# Patient Record
Sex: FEMALE | URBAN - METROPOLITAN AREA
[De-identification: names, ages, dates, MRNs, and addresses within clinical notes are randomized per-mention and may not be internally consistent; named-entity substitution may affect disease eponyms.]

---

## 2022-06-04 ENCOUNTER — TELEPHONE ENCOUNTER (OUTPATIENT)
Dept: URBAN - METROPOLITAN AREA CLINIC 68 | Facility: CLINIC | Age: 72
End: 2022-06-04

## 2022-06-04 RX ORDER — OMEGA-3/DHA/EPA/FISH OIL 60 MG-90MG
FISH OIL(    ONE CAPSULE BY MOUTH TWICE EACH DAY ) INACTIVE -HX ENTRY CAPSULE ORAL
OUTPATIENT
Start: 2008-07-15

## 2022-06-04 RX ORDER — RABEPRAZOLE SODIUM 20 MG/1
ACIPHEX(    ONE PO 30 MINUTES BEFORE BREAKFAST. ) INACTIVE -HX ENTRY TABLET, DELAYED RELEASE ORAL
OUTPATIENT
Start: 2008-07-24

## 2022-06-04 RX ORDER — SULFAMETHOXAZOLE/TRIMETHOPRIM 800-160 MG
BACTRIM DS(    1 TABLET PO BID ) INACTIVE -HX ENTRY TABLET ORAL
OUTPATIENT
Start: 2010-12-09

## 2022-06-04 RX ORDER — PANTOPRAZOLE SODIUM 40 MG/1
PROTONIX(    1 TABLET DAILY ) INACTIVE -HX ENTRY TABLET, DELAYED RELEASE ORAL
OUTPATIENT
Start: 2008-07-15

## 2022-06-04 RX ORDER — RABEPRAZOLE SODIUM 20 MG/1
ACIPHEX(    ONE PO 30 MINUTES BEFORE BREAKFAST. ) INACTIVE -HX ENTRY TABLET, DELAYED RELEASE ORAL
OUTPATIENT
Start: 2008-12-23

## 2022-06-04 RX ORDER — RABEPRAZOLE SODIUM 20 MG/1
ACIPHEX(    ONE PO 30 MINUTES BEFORE BREAKFAST. ) INACTIVE -HX ENTRY TABLET, DELAYED RELEASE ORAL
OUTPATIENT
Start: 2008-08-15

## 2022-06-05 ENCOUNTER — TELEPHONE ENCOUNTER (OUTPATIENT)
Dept: URBAN - METROPOLITAN AREA CLINIC 68 | Facility: CLINIC | Age: 72
End: 2022-06-05

## 2022-06-05 RX ORDER — MONTELUKAST SODIUM 10 MG/1
TABLET, FILM COATED ORAL
Status: ACTIVE | COMMUNITY
Start: 2008-07-15

## 2022-06-05 RX ORDER — OMEPRAZOLE 20 MG/1
CAPSULE, DELAYED RELEASE ORAL
Status: ACTIVE | COMMUNITY
Start: 2009-05-19

## 2022-06-25 ENCOUNTER — TELEPHONE ENCOUNTER (OUTPATIENT)
Age: 72
End: 2022-06-25

## 2022-06-25 RX ORDER — RABEPRAZOLE SODIUM 20 MG/1
ACIPHEX(    ONE PO 30 MINUTES BEFORE BREAKFAST. ) INACTIVE -HX ENTRY TABLET, DELAYED RELEASE ORAL
OUTPATIENT
Start: 2008-07-24

## 2022-06-25 RX ORDER — POLYETHYLENE GLYCOL 3350, SODIUM SULFATE, SODIUM CHLORIDE, POTASSIUM CHLORIDE, ASCORBIC ACID, SODIUM ASCORBATE 7.5-2.691G
MOVIPREP(    AS DIRECTED ) INACTIVE -HX ENTRY KIT ORAL AS DIRECTED
OUTPATIENT
Start: 2010-10-14

## 2022-06-25 RX ORDER — SULFAMETHOXAZOLE/TRIMETHOPRIM 800-160 MG
BACTRIM DS(    1 TABLET PO BID ) INACTIVE -HX ENTRY TABLET ORAL
OUTPATIENT
Start: 2010-12-09

## 2022-06-25 RX ORDER — OMEGA-3/DHA/EPA/FISH OIL 60 MG-90MG
FISH OIL(    ONE CAPSULE BY MOUTH TWICE EACH DAY ) INACTIVE -HX ENTRY CAPSULE ORAL
OUTPATIENT
Start: 2008-07-15

## 2022-06-25 RX ORDER — RABEPRAZOLE SODIUM 20 MG/1
ACIPHEX(    ONE PO 30 MINUTES BEFORE BREAKFAST. ) INACTIVE -HX ENTRY TABLET, DELAYED RELEASE ORAL
OUTPATIENT
Start: 2008-12-23

## 2022-06-25 RX ORDER — CHOLESTYRAMINE 4 G/9G
QUESTRAN(    1 PACK DAILY ) INACTIVE -HX ENTRY POWDER, FOR SUSPENSION ORAL
OUTPATIENT
Start: 2010-07-27

## 2022-06-25 RX ORDER — RABEPRAZOLE SODIUM 20 MG/1
ACIPHEX(    ONE PO 30 MINUTES BEFORE BREAKFAST. ) INACTIVE -HX ENTRY TABLET, DELAYED RELEASE ORAL
OUTPATIENT
Start: 2008-08-15

## 2022-06-26 ENCOUNTER — TELEPHONE ENCOUNTER (OUTPATIENT)
Age: 72
End: 2022-06-26

## 2022-06-26 RX ORDER — OMEPRAZOLE 20 MG/1
CAPSULE, DELAYED RELEASE ORAL
Status: ACTIVE | COMMUNITY
Start: 2009-05-19

## 2022-06-26 RX ORDER — ATENOLOL 25 MG/1
ATENOLOL(    1 TABLET DAILY ) ACTIVE -HX ENTRY TABLET ORAL
Status: ACTIVE | COMMUNITY
Start: 2008-07-15

## 2022-06-26 RX ORDER — MONTELUKAST SODIUM 10 MG/1
TABLET, FILM COATED ORAL
Status: ACTIVE | COMMUNITY
Start: 2008-07-15

## 2022-06-26 RX ORDER — EZETIMIBE AND SIMVASTATIN 10; 10 MG/1; MG/1
VYTORIN(    1 PO QD ) ACTIVE -HX ENTRY TABLET ORAL
Status: ACTIVE | COMMUNITY
Start: 2008-07-15

## 2022-06-26 RX ORDER — DEXTROMETHORPHAN POLISTIREX 30 MG/5 ML
CALCIUM-VITAMIN D(    1 TABLET DAILY ) ACTIVE -HX ENTRY SUSPENSION, EXTENDED RELEASE 12 HR ORAL
Status: ACTIVE | COMMUNITY
Start: 2008-07-15

## 2022-06-26 RX ORDER — CITALOPRAM 10 MG/1
CITALOPRAM HYDROBROMIDE(    1 TABLET DAILY ) ACTIVE -HX ENTRY TABLET ORAL
Status: ACTIVE | COMMUNITY
Start: 2010-07-27

## 2024-01-03 ENCOUNTER — OFFICE VISIT (OUTPATIENT)
Dept: URBAN - METROPOLITAN AREA CLINIC 68 | Facility: CLINIC | Age: 74
End: 2024-01-03

## 2024-02-23 ENCOUNTER — LAB (OUTPATIENT)
Dept: URBAN - METROPOLITAN AREA CLINIC 68 | Facility: CLINIC | Age: 74
End: 2024-02-23

## 2024-02-26 ENCOUNTER — OV NP (OUTPATIENT)
Dept: URBAN - METROPOLITAN AREA CLINIC 68 | Facility: CLINIC | Age: 74
End: 2024-02-26
Payer: MEDICARE

## 2024-02-26 ENCOUNTER — LAB (OUTPATIENT)
Dept: URBAN - METROPOLITAN AREA CLINIC 68 | Facility: CLINIC | Age: 74
End: 2024-02-26

## 2024-02-26 VITALS
HEIGHT: 67 IN | DIASTOLIC BLOOD PRESSURE: 68 MMHG | BODY MASS INDEX: 22.6 KG/M2 | WEIGHT: 144 LBS | SYSTOLIC BLOOD PRESSURE: 118 MMHG

## 2024-02-26 DIAGNOSIS — Z86.010 PERSONAL HISTORY OF COLONIC POLYPS: ICD-10-CM

## 2024-02-26 DIAGNOSIS — K76.0 FATTY LIVER: ICD-10-CM

## 2024-02-26 DIAGNOSIS — R15.9 FREQUENT FECAL INCONTINENCE: ICD-10-CM

## 2024-02-26 DIAGNOSIS — R19.7 INTERMITTENT DIARRHEA: ICD-10-CM

## 2024-02-26 PROBLEM — 197321007: Status: ACTIVE | Noted: 2024-02-23

## 2024-02-26 PROBLEM — 422587007: Status: ACTIVE | Noted: 2024-02-26

## 2024-02-26 PROBLEM — 428283002: Status: ACTIVE | Noted: 2024-02-23

## 2024-02-26 PROBLEM — 89362005: Status: ACTIVE | Noted: 2024-02-26

## 2024-02-26 PROBLEM — 460671000124103: Status: ACTIVE | Noted: 2024-02-26

## 2024-02-26 PROBLEM — 62315008: Status: ACTIVE | Noted: 2024-02-26

## 2024-02-26 PROCEDURE — 99204 OFFICE O/P NEW MOD 45 MIN: CPT

## 2024-02-26 RX ORDER — OMEPRAZOLE 20 MG/1
CAPSULE, DELAYED RELEASE ORAL
Status: ACTIVE | COMMUNITY
Start: 2009-05-19

## 2024-02-26 RX ORDER — MONTELUKAST SODIUM 10 MG/1
TABLET, FILM COATED ORAL
Status: ACTIVE | COMMUNITY
Start: 2008-07-15

## 2024-02-26 RX ORDER — CITALOPRAM 10 MG/1
CITALOPRAM HYDROBROMIDE(    1 TABLET DAILY ) ACTIVE -HX ENTRY TABLET ORAL
Status: ACTIVE | COMMUNITY
Start: 2010-07-27

## 2024-02-26 RX ORDER — ATENOLOL 25 MG/1
ATENOLOL(    1 TABLET DAILY ) ACTIVE -HX ENTRY TABLET ORAL
Status: ACTIVE | COMMUNITY
Start: 2008-07-15

## 2024-02-26 RX ORDER — EZETIMIBE AND SIMVASTATIN 10; 10 MG/1; MG/1
VYTORIN(    1 PO QD ) ACTIVE -HX ENTRY TABLET ORAL
Status: ACTIVE | COMMUNITY
Start: 2008-07-15

## 2024-02-26 RX ORDER — DEXTROMETHORPHAN POLISTIREX 30 MG/5 ML
CALCIUM-VITAMIN D(    1 TABLET DAILY ) ACTIVE -HX ENTRY SUSPENSION, EXTENDED RELEASE 12 HR ORAL
Status: ACTIVE | COMMUNITY
Start: 2008-07-15

## 2024-02-26 NOTE — HPI-TODAY'S VISIT:
72 y/o with history of colon polyps, IBS, celiac disease, and prediabetes presents today due to chronic fecal incontinence which has been worsening over the past few months per patient.  She is s/p colonoscopy in 2021 with Dr. Shook found with diverticulosis and IH.  She has had a couple episodes of nocturnal fecal incontinence.  She denies black stools or rectal bleeding.  She describes being diagnosed with celiac disease 20+ years ago and does adhere to a gluten-free diet as well as a strict low FODMAP diet.  She is frustrated because she feels there is not much she can eat as she is also following a "diabetic diet".  She describes a 30 pound weight loss over the past 4-5 months and is recommended CT of the abdomen/pelvis.  Will proceed with stool studies and she will likely need a colonoscopy in the near future, this was discussed today.  She does describe intermittent mild nausea as well as lower abdominal cramping.  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 She denies vomiting, dysphagia, abdominal pain, constipation, melena, rectal bleeding, weight loss, fever.

## 2024-02-28 PROBLEM — 266435005: Status: ACTIVE | Noted: 2024-02-28

## 2024-03-14 ENCOUNTER — LAB (OUTPATIENT)
Dept: URBAN - METROPOLITAN AREA CLINIC 68 | Facility: CLINIC | Age: 74
End: 2024-03-14

## 2024-03-14 ENCOUNTER — OV EP (OUTPATIENT)
Dept: URBAN - METROPOLITAN AREA CLINIC 68 | Facility: CLINIC | Age: 74
End: 2024-03-14
Payer: MEDICARE

## 2024-03-14 VITALS
OXYGEN SATURATION: 97 % | HEART RATE: 110 BPM | WEIGHT: 144 LBS | HEIGHT: 67 IN | DIASTOLIC BLOOD PRESSURE: 76 MMHG | SYSTOLIC BLOOD PRESSURE: 118 MMHG | BODY MASS INDEX: 22.6 KG/M2

## 2024-03-14 DIAGNOSIS — R19.7 INTERMITTENT DIARRHEA: ICD-10-CM

## 2024-03-14 DIAGNOSIS — Z86.010 PERSONAL HISTORY OF COLONIC POLYPS: ICD-10-CM

## 2024-03-14 DIAGNOSIS — R11.0 NAUSEA: ICD-10-CM

## 2024-03-14 DIAGNOSIS — R15.9 FREQUENT FECAL INCONTINENCE: ICD-10-CM

## 2024-03-14 DIAGNOSIS — R63.4 WEIGHT LOSS: ICD-10-CM

## 2024-03-14 DIAGNOSIS — K76.0 FATTY LIVER: ICD-10-CM

## 2024-03-14 PROCEDURE — 99214 OFFICE O/P EST MOD 30 MIN: CPT

## 2024-03-14 RX ORDER — CITALOPRAM 10 MG/1
CITALOPRAM HYDROBROMIDE(    1 TABLET DAILY ) ACTIVE -HX ENTRY TABLET ORAL
Status: ACTIVE | COMMUNITY
Start: 2010-07-27

## 2024-03-14 RX ORDER — METHOTREXATE 25 MG/ML
SOLUTION INTRA-ARTERIAL; INTRAMUSCULAR; INTRATHECAL; INTRAVENOUS
Qty: 26 | Status: ACTIVE | COMMUNITY

## 2024-03-14 RX ORDER — NEEDLES, SAFETY 22GX1 1/2"
NEEDLE, DISPOSABLE MISCELLANEOUS
Qty: 13 | Status: ACTIVE | COMMUNITY

## 2024-03-14 RX ORDER — ATENOLOL 25 MG/1
ATENOLOL(    1 TABLET DAILY ) ACTIVE -HX ENTRY TABLET ORAL
Status: ACTIVE | COMMUNITY
Start: 2008-07-15

## 2024-03-14 RX ORDER — SYRINGE AND NEEDLE,INSULIN,1ML 30 G X1/2"
SYRINGE, EMPTY DISPOSABLE MISCELLANEOUS
Qty: 10 | Status: ACTIVE | COMMUNITY

## 2024-03-14 RX ORDER — OMEPRAZOLE 20 MG/1
CAPSULE, DELAYED RELEASE ORAL
Status: DISCONTINUED | COMMUNITY
Start: 2009-05-19

## 2024-03-14 RX ORDER — MONTELUKAST SODIUM 10 MG/1
TABLET, FILM COATED ORAL
Status: DISCONTINUED | COMMUNITY
Start: 2008-07-15

## 2024-03-14 NOTE — HPI-TODAY'S VISIT:
72 y/o with history of colon polyps, IBS, celiac disease, and prediabetes presents today due to chronic fecal incontinence which has been worsening over the past few months per patient. She had stool studies found normal on 3/5/24.  She did have [employer] IBS testing and results were reviewed today. She did add daily Benefiber which has helped significantly.  She is recommended HBT to r/o SIBO.  Per patient she did have colonoscopy in 2023, pending records. She is s/p colonoscopy in 2021 with Dr. Shook found with diverticulosis and IH.  She has had a couple episodes of nocturnal fecal incontinence.  She denies black stools or rectal bleeding.  She describes being diagnosed with celiac disease 20+ years ago and does adhere to a gluten-free diet as well as a strict low FODMAP diet.  She is frustrated because she feels there is not much she can eat as she is also following a "diabetic diet".  She describes a 30 pound weight loss over the past 4-5 months and is pending CT of the abdomen/pelvis.  She does describe intermittent mild nausea as well as lower abdominal cramping.  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 She denies vomiting, dysphagia, abdominal pain, constipation, melena, rectal bleeding, weight loss, fever.

## 2024-04-10 ENCOUNTER — ANC VISIT (OUTPATIENT)
Dept: URBAN - METROPOLITAN AREA CLINIC 67 | Facility: CLINIC | Age: 74
End: 2024-04-10
Payer: MEDICARE

## 2024-04-10 DIAGNOSIS — K63.8219 SMALL INTESTINAL BACTERIAL OVERGROWTH (SIBO): ICD-10-CM

## 2024-04-10 PROCEDURE — 91065 BREATH HYDROGEN/METHANE TEST: CPT | Performed by: INTERNAL MEDICINE

## 2024-04-15 ENCOUNTER — TELEP (OUTPATIENT)
Dept: URBAN - METROPOLITAN AREA CLINIC 68 | Facility: CLINIC | Age: 74
End: 2024-04-15
Payer: MEDICARE

## 2024-04-15 VITALS — HEIGHT: 67 IN | WEIGHT: 140 LBS | BODY MASS INDEX: 21.97 KG/M2

## 2024-04-15 DIAGNOSIS — K76.0 FATTY LIVER: ICD-10-CM

## 2024-04-15 DIAGNOSIS — K58.0 IRRITABLE BOWEL SYNDROME WITH DIARRHEA: ICD-10-CM

## 2024-04-15 DIAGNOSIS — R15.9 FREQUENT FECAL INCONTINENCE: ICD-10-CM

## 2024-04-15 DIAGNOSIS — R63.4 WEIGHT LOSS: ICD-10-CM

## 2024-04-15 PROCEDURE — 99214 OFFICE O/P EST MOD 30 MIN: CPT

## 2024-04-15 RX ORDER — CITALOPRAM 10 MG/1
CITALOPRAM HYDROBROMIDE(    1 TABLET DAILY ) ACTIVE -HX ENTRY TABLET ORAL
Status: ACTIVE | COMMUNITY
Start: 2010-07-27

## 2024-04-15 RX ORDER — RIFAXIMIN 550 MG/1
1 TABLET TABLET ORAL THREE TIMES A DAY
Qty: 42 TABLET | Refills: 1 | OUTPATIENT
Start: 2024-04-12 | End: 2024-05-10

## 2024-04-15 RX ORDER — SYRINGE AND NEEDLE,INSULIN,1ML 30 G X1/2"
SYRINGE, EMPTY DISPOSABLE MISCELLANEOUS
Qty: 10 | Status: ACTIVE | COMMUNITY

## 2024-04-15 RX ORDER — METHOTREXATE 25 MG/ML
SOLUTION INTRA-ARTERIAL; INTRAMUSCULAR; INTRATHECAL; INTRAVENOUS
Qty: 26 | Status: ACTIVE | COMMUNITY

## 2024-04-15 RX ORDER — ATENOLOL 25 MG/1
ATENOLOL(    1 TABLET DAILY ) ACTIVE -HX ENTRY TABLET ORAL
Status: ACTIVE | COMMUNITY
Start: 2008-07-15

## 2024-04-15 RX ORDER — NEEDLES, SAFETY 22GX1 1/2"
NEEDLE, DISPOSABLE MISCELLANEOUS
Qty: 13 | Status: ACTIVE | COMMUNITY

## 2024-04-15 NOTE — HPI-TODAY'S VISIT:
72 y/o with history of colon polyps, IBS, celiac disease, and prediabetes presents today via telehealth to review HBT results consistent with SIBO. She continues with diarrhea and intermittent episodes of fecal incontinence and is recommended tx with Xifaxan. She had stool studies found normal on 3/5/24.  She did have [employer] IBS testing.  Per patient she did have colonoscopy in 2023, pending records. She is s/p colonoscopy in 2021 with Dr. Shook found with diverticulosis and IH.  She has had a couple episodes of nocturnal fecal incontinence.  She denies black stools or rectal bleeding.  She describes being diagnosed with celiac disease 20+ years ago and does adhere to a gluten-free diet as well as a strict low FODMAP diet.  She is frustrated because she feels there is not much she can eat as she is also following a "diabetic diet".  She describes a 30 pound weight loss over the past 4-5 months and is pending CT of the abdomen/pelvis.  She does describe intermittent mild nausea as well as lower abdominal cramping.  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 She denies vomiting, dysphagia, abdominal pain, constipation, melena, rectal bleeding, weight loss, fever.

## 2024-04-22 ENCOUNTER — TELEP (OUTPATIENT)
Dept: URBAN - METROPOLITAN AREA CLINIC 68 | Facility: CLINIC | Age: 74
End: 2024-04-22
Payer: MEDICARE

## 2024-04-22 VITALS — BODY MASS INDEX: 21.97 KG/M2 | WEIGHT: 140 LBS | HEIGHT: 67 IN

## 2024-04-22 DIAGNOSIS — K76.0 FATTY LIVER: ICD-10-CM

## 2024-04-22 DIAGNOSIS — K63.8219 SMALL INTESTINAL BACTERIAL OVERGROWTH (SIBO): ICD-10-CM

## 2024-04-22 DIAGNOSIS — K58.0 IRRITABLE BOWEL SYNDROME WITH DIARRHEA: ICD-10-CM

## 2024-04-22 DIAGNOSIS — R15.9 FREQUENT FECAL INCONTINENCE: ICD-10-CM

## 2024-04-22 PROBLEM — 197125005: Status: ACTIVE | Noted: 2024-04-12

## 2024-04-22 PROBLEM — 446081009: Status: ACTIVE | Noted: 2024-04-12

## 2024-04-22 PROCEDURE — 99214 OFFICE O/P EST MOD 30 MIN: CPT

## 2024-04-22 RX ORDER — METHOTREXATE 25 MG/ML
SOLUTION INTRA-ARTERIAL; INTRAMUSCULAR; INTRATHECAL; INTRAVENOUS
Qty: 26 | Status: ACTIVE | COMMUNITY

## 2024-04-22 RX ORDER — CITALOPRAM 10 MG/1
CITALOPRAM HYDROBROMIDE(    1 TABLET DAILY ) ACTIVE -HX ENTRY TABLET ORAL
Status: ACTIVE | COMMUNITY
Start: 2010-07-27

## 2024-04-22 RX ORDER — RIFAXIMIN 550 MG/1
1 TABLET TABLET ORAL THREE TIMES A DAY
Qty: 42 TABLET | Refills: 1 | Status: ACTIVE | COMMUNITY
Start: 2024-04-12 | End: 2024-05-10

## 2024-04-22 RX ORDER — ATENOLOL 25 MG/1
ATENOLOL(    1 TABLET DAILY ) ACTIVE -HX ENTRY TABLET ORAL
Status: ACTIVE | COMMUNITY
Start: 2008-07-15

## 2024-04-22 RX ORDER — SYRINGE AND NEEDLE,INSULIN,1ML 30 G X1/2"
SYRINGE, EMPTY DISPOSABLE MISCELLANEOUS
Qty: 10 | Status: ACTIVE | COMMUNITY

## 2024-04-22 RX ORDER — NEEDLES, SAFETY 22GX1 1/2"
NEEDLE, DISPOSABLE MISCELLANEOUS
Qty: 13 | Status: ACTIVE | COMMUNITY

## 2024-04-22 NOTE — HPI-TODAY'S VISIT:
72 y/o with history of colon polyps, IBS, celiac disease, and prediabetes presents today via telehealth with questions. She had HBT consistent with SIBO and the Xifaxan is too costly for her.  She is dissatisfied that we do not have samples for her yet. Alternative abx tx was offered but patient defers. She continues with bloating, diarrhea and intermittent episodes of fecal incontinence despite daily Metamucil. She had stool studies found normal on 3/5/24.  She did have [employer] IBS testing.  Per patient she did have colonoscopy in 2023, pending records. She is s/p colonoscopy in 2021 with Dr. Shook found with diverticulosis and IH.  She has had a couple episodes of nocturnal fecal incontinence.  She denies black stools or rectal bleeding.  She describes being diagnosed with celiac disease 20+ years ago and does adhere to a gluten-free diet as well as a strict low FODMAP diet.  She is frustrated because she feels there is not much she can eat as she is also following a "diabetic diet".  She describes a 30 pound weight loss over the past 4-5 months and is pending CT of the abdomen/pelvis. Per patient [facility] cancelled her CT and it is now scheduled for 5/10/24. She does describe intermittent mild nausea as well as lower abdominal cramping.  If CT is negative she will need EGD/flex sig. 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 She denies vomiting, dysphagia, abdominal pain, constipation, melena, rectal bleeding, weight loss, fever.

## 2024-04-25 ENCOUNTER — OV EP (OUTPATIENT)
Dept: URBAN - METROPOLITAN AREA CLINIC 68 | Facility: CLINIC | Age: 74
End: 2024-04-25

## 2024-05-14 ENCOUNTER — TELEPHONE ENCOUNTER (OUTPATIENT)
Dept: URBAN - METROPOLITAN AREA CLINIC 68 | Facility: CLINIC | Age: 74
End: 2024-05-14

## 2024-05-17 ENCOUNTER — TELEPHONE ENCOUNTER (OUTPATIENT)
Dept: URBAN - METROPOLITAN AREA CLINIC 68 | Facility: CLINIC | Age: 74
End: 2024-05-17

## 2024-05-20 ENCOUNTER — TELEPHONE ENCOUNTER (OUTPATIENT)
Dept: URBAN - METROPOLITAN AREA CLINIC 68 | Facility: CLINIC | Age: 74
End: 2024-05-20

## 2024-05-20 ENCOUNTER — OFFICE VISIT (OUTPATIENT)
Dept: URBAN - METROPOLITAN AREA TELEHEALTH 1 | Facility: TELEHEALTH | Age: 74
End: 2024-05-20
Payer: MEDICARE

## 2024-05-20 ENCOUNTER — DASHBOARD ENCOUNTERS (OUTPATIENT)
Age: 74
End: 2024-05-20

## 2024-05-20 VITALS — HEIGHT: 67 IN | BODY MASS INDEX: 21.97 KG/M2 | WEIGHT: 140 LBS

## 2024-05-20 DIAGNOSIS — K58.0 IRRITABLE BOWEL SYNDROME WITH DIARRHEA: ICD-10-CM

## 2024-05-20 DIAGNOSIS — K63.8219 SMALL INTESTINAL BACTERIAL OVERGROWTH (SIBO): ICD-10-CM

## 2024-05-20 DIAGNOSIS — R15.9 FREQUENT FECAL INCONTINENCE: ICD-10-CM

## 2024-05-20 DIAGNOSIS — K76.0 FATTY LIVER: ICD-10-CM

## 2024-05-20 PROCEDURE — 99214 OFFICE O/P EST MOD 30 MIN: CPT

## 2024-05-20 RX ORDER — ATENOLOL 25 MG/1
ATENOLOL(    1 TABLET DAILY ) ACTIVE -HX ENTRY TABLET ORAL
Status: ACTIVE | COMMUNITY
Start: 2008-07-15

## 2024-05-20 RX ORDER — NEEDLES, SAFETY 22GX1 1/2"
NEEDLE, DISPOSABLE MISCELLANEOUS
Qty: 13 | Status: ACTIVE | COMMUNITY

## 2024-05-20 RX ORDER — SYRINGE AND NEEDLE,INSULIN,1ML 30 G X1/2"
SYRINGE, EMPTY DISPOSABLE MISCELLANEOUS
Qty: 10 | Status: ACTIVE | COMMUNITY

## 2024-05-20 RX ORDER — CITALOPRAM 10 MG/1
CITALOPRAM HYDROBROMIDE(    1 TABLET DAILY ) ACTIVE -HX ENTRY TABLET ORAL
Status: ACTIVE | COMMUNITY
Start: 2010-07-27

## 2024-05-20 RX ORDER — METHOTREXATE 25 MG/ML
SOLUTION INTRA-ARTERIAL; INTRAMUSCULAR; INTRATHECAL; INTRAVENOUS
Qty: 26 | Status: ACTIVE | COMMUNITY

## 2024-06-07 ENCOUNTER — OFFICE VISIT (OUTPATIENT)
Dept: URBAN - METROPOLITAN AREA CLINIC 68 | Facility: CLINIC | Age: 74
End: 2024-06-07
Payer: MEDICARE

## 2024-06-07 VITALS
HEART RATE: 57 BPM | BODY MASS INDEX: 21.82 KG/M2 | OXYGEN SATURATION: 97 % | WEIGHT: 139 LBS | DIASTOLIC BLOOD PRESSURE: 82 MMHG | SYSTOLIC BLOOD PRESSURE: 138 MMHG | HEIGHT: 67 IN

## 2024-06-07 DIAGNOSIS — K57.90 DIVERTICULOSIS: ICD-10-CM

## 2024-06-07 DIAGNOSIS — R73.03 PREDIABETES: ICD-10-CM

## 2024-06-07 DIAGNOSIS — Z87.19 HISTORY OF IBS: ICD-10-CM

## 2024-06-07 DIAGNOSIS — K90.0 CELIAC DISEASE: ICD-10-CM

## 2024-06-07 DIAGNOSIS — R15.1 FECAL SMEARING: ICD-10-CM

## 2024-06-07 PROBLEM — 397881000: Status: ACTIVE | Noted: 2024-06-07

## 2024-06-07 PROBLEM — 396331005: Status: ACTIVE | Noted: 2024-06-07

## 2024-06-07 PROBLEM — 70871000119100: Status: ACTIVE | Noted: 2024-06-07

## 2024-06-07 PROBLEM — 225593006: Status: ACTIVE | Noted: 2024-06-07

## 2024-06-07 PROBLEM — 714628002: Status: ACTIVE | Noted: 2024-06-07

## 2024-06-07 PROCEDURE — 99214 OFFICE O/P EST MOD 30 MIN: CPT | Performed by: INTERNAL MEDICINE

## 2024-06-07 RX ORDER — AMPICILLIN TRIHYDRATE 250 MG
AS DIRECTED CAPSULE ORAL
Status: ACTIVE | COMMUNITY

## 2024-06-07 RX ORDER — CITALOPRAM 10 MG/1
CITALOPRAM HYDROBROMIDE(    1 TABLET DAILY ) ACTIVE -HX ENTRY TABLET ORAL
Status: ACTIVE | COMMUNITY
Start: 2010-07-27

## 2024-06-07 RX ORDER — SYRINGE AND NEEDLE,INSULIN,1ML 30 G X1/2"
SYRINGE, EMPTY DISPOSABLE MISCELLANEOUS
Qty: 10 | Status: ACTIVE | COMMUNITY

## 2024-06-07 RX ORDER — ATENOLOL 25 MG/1
ATENOLOL(    1 TABLET DAILY ) ACTIVE -HX ENTRY TABLET ORAL
Status: ACTIVE | COMMUNITY
Start: 2008-07-15

## 2024-06-07 RX ORDER — PEPPERMINT OIL 90 MG
AS DIRECTED CAPSULE, DELAYED, AND EXTENDED RELEASE ORAL
OUTPATIENT
Start: 2024-06-07

## 2024-06-07 RX ORDER — METHOTREXATE 25 MG/ML
SOLUTION INTRA-ARTERIAL; INTRAMUSCULAR; INTRATHECAL; INTRAVENOUS
Qty: 26 | Status: ACTIVE | COMMUNITY

## 2024-06-07 RX ORDER — NEEDLES, SAFETY 22GX1 1/2"
NEEDLE, DISPOSABLE MISCELLANEOUS
Qty: 13 | Status: ACTIVE | COMMUNITY

## 2024-06-07 NOTE — HPI-TODAY'S VISIT:
72 y/o with history of colon polyps, IBS, celiac disease, and prediabetes who is here for follow up. She does have some episodes of fecal leakage particularly with loose stools. She has tried a low FODMAP diet. She had HBT consistent with SIBO and did take Xifaxan with some improvement.  She does take daily Benefiber. She had stool studies found normal on 3/5/24.  She is recommended to try Ibgard.  Per patient she did have colonoscopy in 2023, pending records. She is s/p colonoscopy in 2021 with Dr. Shook found with diverticulosis and IH.  She describes being diagnosed with celiac disease 20+ years ago and does adhere to a gluten-free diet as well as a strict low FODMAP diet.    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 A CT scan of abdomen/pelvis in 5/2024 showed diverticulosis, no acute pathology. She has occasional fecal smearing and is agreeable to be referred to pelvic floor therapist.  She denies any pelvic surgery or vaginal delivery. She would like to be referred to Nutritionist for her celiac and prediabetes.